# Patient Record
Sex: MALE | Race: WHITE | NOT HISPANIC OR LATINO | Employment: OTHER | URBAN - METROPOLITAN AREA
[De-identification: names, ages, dates, MRNs, and addresses within clinical notes are randomized per-mention and may not be internally consistent; named-entity substitution may affect disease eponyms.]

---

## 2017-04-19 ENCOUNTER — ALLSCRIPTS OFFICE VISIT (OUTPATIENT)
Dept: OTHER | Facility: OTHER | Age: 72
End: 2017-04-19

## 2017-08-23 ENCOUNTER — ALLSCRIPTS OFFICE VISIT (OUTPATIENT)
Dept: OTHER | Facility: OTHER | Age: 72
End: 2017-08-23

## 2017-10-24 NOTE — PROGRESS NOTES
Assessment  1  Actinic keratosis (702 0) (L57 0)   2  Screening for skin condition (V82 0) (Z13 89)   3  Seborrheic keratosis (702 19) (L82 1)   4  H/O malignant melanoma of skin (V10 82) (I16 460)   · Melanoma In situ excised March 2007, right neck   5  H/O nonmelanoma skin cancer (V10 83) (Z85 828)    Plan   · Wound care as instructed ; Status:Complete;   Done: 10LVE8462   · Use a sun block product with an SPF of 15 or more ; Status:Complete;   Done:  62CRN8884   · Follow-up visit in 4 Months Evaluation and Treatment  Follow-up  Status: Complete   Done: 65Fua2392    Discussion/Summary  Discussion Summary- [facility]:   Assessment #1: Seborrheic keratosis  Care Plan:   Patient reassured these are normal growths we acquire with age no treatment needed  Assessment #2: Screening for dermatologic disorders  Care Plan:   Nothing else of concern noted on complete exam follow-up in 4 months  Assessment #3: History skin cancer/history of melanoma  Care Plan:   No recurrence nothing else atypical sunblock recommended follow-up in 4 months continue self exams  Assessment #4: Actinic keratosis  Care Plan:   Patient advised lesions are precancers should resolve with cryosurgery if not to let us know sunblock recommended  Chief Complaint  Chief Complaint Free Text Note Form: 4 month skin cancer screening      History of Present Illness  HPI: 72-year-old male presents for overall checkup with previous history of both melanoma and nonmelanoma skin cancer complains of some scaling areas on his skin      Review of Systems  Complete Male Dermatology [facility] Patient:   Constitutional: Denies constitutional symptoms  Eyes: Denies eye symptoms  ENT:  denies ear symptoms, nasal symptoms, mouth or throat symptoms  Cardiovascular: Denies cardiovascular symptoms  Respiratory: Denies respiratory symptoms  Gastrointestinal: Denies gastrointestinal symptoms     Musculoskeletal: Denies musculoskeletal symptoms  Integumentary: Denies skin, hair and nail symptoms  Neurological: Denies neurologic symptoms  Psychiatric: Denies psychiatric symptoms  Endocrine: Denies endocrine symptoms  Hematologic/Lymphatic: Denies hematologic symptoms  Active Problems  1  Actinic keratosis (702 0) (L57 0)   2  Basal Cell Carcinoma Of Skin Of Trunk (173 51)   3  Changing skin lesion (709 9) (L98 9)   4  Herpes zoster (053 9) (B02 9)   5  Inflamed seborrheic keratosis (702 11) (L82 0)   6  Postherpetic neuralgia (053 19) (B02 29)   7  Screening for skin condition (V82 0) (Z13 89)   8  Seborrheic dermatitis (690 10) (L21 9)   9  Seborrheic keratosis (702 19) (L82 1)   10  Skin lesion (709 9) (L98 9)   11  Skin rash (782 1) (R21)   12  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 52) (C44 529)    Past Medical History  1  History of Basal cell carcinoma of face (173 31) (C44 310)   2  History of Basal Cell Carcinoma Of The Skin Of The Leg (173 71)   3  History of Basal Cell Carcinoma Of The Skin Of The Neck (173 41)   4  H/O malignant melanoma of skin (V10 82) (Z85 820)   5  H/O nonmelanoma skin cancer (V10 83) (X37 651)   6  History of benign neoplasm of skin (V13 3) (Z87 2)   7  History of viral warts (V12 09) (Z86 19)   8  History of Malignant Skin Neoplasm (V10 83)  Past Medical History Reviewed- Derm:   The past medical history was reviewed  Surgical History  1  History of Chemosurgery (Mohs Micrographic Technique)   2  History of Excision Of Lesion Face Malignant   3  History of Excision Of Lesion Legs Malignant   4  History of Excision Of Lesion Scalp Malignant   5  History of Excision Of Lesion Trunk Malignant   6  History of Mohs Micrographic Surgery Face  Surgical History Reviewed Claudetta Casey- Derm:   Surgical History reviewed      Family History  Mother    1   Family history of Cancer  Family History Reviewed- Derm:   Family History was reviewed      Social History   · Never A Smoker  Social History Reviewed Claudetta Casey- Derm: The social history was reviewed      Current Meds   1  [address] [facility]; Therapy: (Recorded:57Xcc8463) to Recorded   2  Losartan Potassium 25 MG Oral Tablet Recorded   3  Metoprolol Tartrate 50 MG Oral Tablet Recorded   4  Vitamin D3 TABS; Therapy: (Bertin Nelson) to Recorded   5  Warfarin Sodium 5 MG Oral Tablet Recorded  Medication List Reviewed: The medication list was reviewed and updated today  Allergies  1  Erythromycin Derivatives    Physical Exam    Constitutional   General appearance: Appears healthy and well developed  Lymphatic   No visible disturbance  Musculoskeletal   Digits and nails: No clubbing, cyanosis or edema  Cutaneous and nail exam normal     Skin   Scalp skin texture and hair distribution: Normal skin texture on scalp, normal hair distribution  Head: Abnormal     Neck: Normal turgor, no rashes, no lesions  Chest: Normal turgor, no rashes, no lesions  Abdomen: Normal turgor, no rashes, no lesions  Back: Normal turgor, no rashes, no lesions  Right upper extremity: Normal turgor, no rashes, no lesions  Left upper extremity: Normal turgor, no rashes, no lesions  Right lower extremity: Normal turgor, no rashes, no lesions  Left lower extremity: Normal turgor, no rashes, no lesions  Examination for skin lesions: Abnormal   Skin Lesions: Actinic Keratosis: on area number 4 on the diagram      Neuro/Psych   Alert and oriented x 3  Displays comfort and cooperation during encounterl  Affect is normal     Finding Scaling erythematous areas noted above normal keratotic papules with greasy stuck on appearance previous sites of skin cancer well-healed without recurrence nothing else atypical noted on exam       Procedure    Procedure: destruction of lesion  Indications for the procedure include actinic keratosis     Risks, benefits, alternatives, infection risk, bleeding risk, risk of allergic reaction and the risk of scarring were discussed with the patient--   verbal consent was obtained prior to the procedure  Procedure Note:   The lesion location: See Map  Destruction Technique: cryotherapy with liquid nitrogen application-- and-- 66-21 seconds via cryospray--   Destruction of 4 lesions  Post-Procedure:   Patient Status: the patient tolerated the procedure well  Complications: there were no complications  Future Appointments    Date/Time Provider Specialty Site   12/28/2017 01:20 PM KELVIN Lin   Dermatology [facility] [address]     Signatures   Electronically signed by : KELVIN Thrasher ; Aug 23 2017  1:51PM EST                       (Author)

## 2017-12-28 ENCOUNTER — ALLSCRIPTS OFFICE VISIT (OUTPATIENT)
Dept: OTHER | Facility: OTHER | Age: 72
End: 2017-12-28

## 2017-12-29 NOTE — PROGRESS NOTES
Assessment   1  Seborrheic keratosis (702 19) (L82 1)   2  Screening for skin condition (V82 0) (Z13 89)   3  H/O malignant melanoma of skin (V10 82) (W76 826)   · Melanoma In situ excised March 2007, right neck   4  H/O nonmelanoma skin cancer (V10 83) (Z85 828)    Plan    · Follow-up visit in 4 Months Evaluation and Treatment  Follow-up  Status: Hold For -    Scheduling  Requested for: 07HHH7405   · Use a sun block product with an SPF of 15 or more ; Status:Complete;   Done:    13GBU4012    Discussion/Summary   Discussion Summary- [facility]:      Assessment #1: Seborrheic keratosis  Care Plan:      Patient reassured these are normal growths we acquire with age no treatment needed  Assessment #2: Screening for dermatologic disorders  Care Plan:      Nothing else of concern noted on complete exam follow-up in 4 months difficult to assess small lesions because of excoriations  Assessment #3: History skin cancer/history of melanoma  Care Plan:      No recurrence nothing else atypical sunblock recommended follow-up in 4 months continue self exams  Chief Complaint   Chief Complaint Free Text Note Form: Patient is here for a 4 month follow up  History of Present Illness   HPI: 53-year-old male with history of both melanoma and nonmelanoma skin cancer presents for his 4 month checkup no specific complaints noted      Review of Systems   Complete Male Dermatology [facility] Patient:      Constitutional: Denies constitutional symptoms  Eyes: Denies eye symptoms  ENT:  denies ear symptoms, nasal symptoms, mouth or throat symptoms  Cardiovascular: Denies cardiovascular symptoms  Respiratory: Denies respiratory symptoms  Gastrointestinal: Denies gastrointestinal symptoms  Musculoskeletal: Denies musculoskeletal symptoms  Integumentary: Denies skin, hair and nail symptoms  Neurological: Denies neurologic symptoms        Psychiatric: Denies psychiatric symptoms  Endocrine: Denies endocrine symptoms  Hematologic/Lymphatic: Denies hematologic symptoms  Active Problems   1  Actinic keratosis (702 0) (L57 0)   2  Basal Cell Carcinoma Of Skin Of Trunk (173 51)   3  Changing skin lesion (709 9) (L98 9)   4  Herpes zoster (053 9) (B02 9)   5  Inflamed seborrheic keratosis (702 11) (L82 0)   6  Postherpetic neuralgia (053 19) (B02 29)   7  Screening for skin condition (V82 0) (Z13 89)   8  Seborrheic dermatitis (690 10) (L21 9)   9  Seborrheic keratosis (702 19) (L82 1)   10  Skin lesion (709 9) (L98 9)   11  Skin rash (782 1) (R21)   12  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 52) (C44 529)    Past Medical History   1  History of Basal cell carcinoma of face (173 31) (C44 310)   2  History of Basal Cell Carcinoma Of The Skin Of The Leg (173 71)   3  History of Basal Cell Carcinoma Of The Skin Of The Neck (173 41)   4  H/O malignant melanoma of skin (V10 82) (Z85 820)   5  H/O nonmelanoma skin cancer (V10 83) (A27 605)   6  History of benign neoplasm of skin (V13 3) (Z87 2)   7  History of viral warts (V12 09) (Z86 19)   8  History of Malignant Skin Neoplasm (V10 83)  Past Medical History Reviewed- Derm:    The past medical history was reviewed  Surgical History   1  History of Chemosurgery (Mohs Micrographic Technique)   2  History of Excision Of Lesion Face Malignant   3  History of Excision Of Lesion Legs Malignant   4  History of Excision Of Lesion Scalp Malignant   5  History of Excision Of Lesion Trunk Malignant   6  History of Mohs Micrographic Surgery Face  Surgical History Reviewed Steven Fishman- Derm:    Surgical History reviewed      Family History   Mother    1  Family history of Cancer  Family History Reviewed- Derm:    Family History was reviewed      Social History    · Never A Smoker  Social History Reviewed Steven Fishman- Derm: The social history was reviewed      Current Meds    1  [address] [facility];      Therapy: (Recorded:66Bev6323) to Recorded   2  Losartan Potassium 25 MG Oral Tablet Recorded   3  Metoprolol Tartrate 50 MG Oral Tablet Recorded   4  Vitamin D3 TABS; Therapy: (Risa Betancur) to Recorded   5  Warfarin Sodium 5 MG Oral Tablet Recorded  Medication List Reviewed: The medication list was reviewed and updated today  Allergies   1  Erythromycin Derivatives    Physical Exam        Constitutional      General appearance: Appears healthy and well developed  Lymphatic      No visible disturbance  Musculoskeletal      Digits and nails: No clubbing, cyanosis or edema  Cutaneous and nail exam normal        Skin      Scalp skin texture and hair distribution: Normal skin texture on scalp, normal hair distribution  Head: Normal turgor, no rashes, no lesions  Neck: Normal turgor, no rashes, no lesions  Chest: Normal turgor, no rashes, no lesions  Abdomen: Normal turgor, no rashes, no lesions  Back: Normal turgor, no rashes, no lesions  Right upper extremity: Normal turgor, no rashes, no lesions  Left upper extremity: Normal turgor, no rashes, no lesions  Right lower extremity: Normal turgor, no rashes, no lesions  Left lower extremity: Normal turgor, no rashes, no lesions  Neuro/Psych      Alert and oriented x 3  Displays comfort and cooperation during encounterl  Affect is normal        Finding Previous sites of skin cancer well healed without recurrence occasional excoriations especially on the scalp normal keratotic papules with greasy stuck appearance nothing else atypical noted on complete cutaneous exam       Future Appointments      Date/Time Provider Specialty Site   04/19/2018 11:15 AM KELVIN Lin   Dermatology [facility]     Signatures    Electronically signed by : KELVIN Thrasher ; Dec 28 2017  2:35PM EST                       (Author)

## 2018-05-01 ENCOUNTER — OFFICE VISIT (OUTPATIENT)
Dept: DERMATOLOGY | Facility: CLINIC | Age: 73
End: 2018-05-01
Payer: MEDICARE

## 2018-05-01 DIAGNOSIS — Z85.820 HISTORY OF MELANOMA: ICD-10-CM

## 2018-05-01 DIAGNOSIS — L30.4 INTERTRIGO: Primary | ICD-10-CM

## 2018-05-01 DIAGNOSIS — Z13.89 SCREENING FOR SKIN CONDITION: ICD-10-CM

## 2018-05-01 DIAGNOSIS — Z85.828 HISTORY OF SKIN CANCER: ICD-10-CM

## 2018-05-01 DIAGNOSIS — L57.0 ACTINIC KERATOSIS: ICD-10-CM

## 2018-05-01 DIAGNOSIS — L82.1 SEBORRHEIC KERATOSIS: ICD-10-CM

## 2018-05-01 PROCEDURE — 17000 DESTRUCT PREMALG LESION: CPT | Performed by: DERMATOLOGY

## 2018-05-01 PROCEDURE — 99213 OFFICE O/P EST LOW 20 MIN: CPT | Performed by: DERMATOLOGY

## 2018-05-01 PROCEDURE — 17003 DESTRUCT PREMALG LES 2-14: CPT | Performed by: DERMATOLOGY

## 2018-05-01 RX ORDER — METOPROLOL TARTRATE 50 MG/1
TABLET, FILM COATED ORAL
COMMUNITY

## 2018-05-01 RX ORDER — MELATONIN: COMMUNITY

## 2018-05-01 RX ORDER — WARFARIN SODIUM 5 MG/1
TABLET ORAL
COMMUNITY

## 2018-05-01 RX ORDER — LOSARTAN POTASSIUM 25 MG/1
TABLET ORAL
COMMUNITY

## 2018-05-01 NOTE — PATIENT INSTRUCTIONS
intertrigo skin rubbing again skin difficult to assess if this is psoriasis versus erythrasma patient does not request any treatment at this time   history of melanoma no recurrence nothing else atypical sun protection recommended continue to monitor  Actinic Keratosis:  Patient advised lesions are precancers  These should resolve with cryosurgery if not to let us know sun block recommended  Seborrheic keratosis patient reassured these are normal growths we acquire with age no treatment needed  History of skin cancer in no recurrence nothing else atypical sunblock recommended follow-up in 4 months  Screening for dermatologic disorders nothing else of concern noted on complete exam follow-up in 4 months  Treatment with Cryotherapy    The doctor has treated your skin with nitrogen, which is 320 degrees Fahrenheit below zero  He has given the treated area "frostbite "    Stinging should subside within a few hours  You can take Tylenol for pain, if needed  Over the next few days, it is normal if the area becomes reddened, a blood blister, or swollen with fluid  If the lesion treated was near the eye - you could get a swollen eye over the next few days  Do not panic! This is all temporary, and will resolve with time  There is no special treatment - just keep the area clean  Makeup and BandAids can be used, if preferred  When the area starts to dry up and peel off, using Vaseline can help healing  It usually takes up to a month for it to heal   Some lesions are recurrent and may require repeat treatments  If a lesion has not healed in one month, please don't hesitate to contact us  If you have any further questions that are not answered here, please call us  [phone]    Thank you for allowing us to care for you

## 2018-05-01 NOTE — PROGRESS NOTES
[facility]  [address] [address]     MRN: [id number] : 1945  Encounter: [id number]  Patient Information: Elizabeth Lee  Chief complaint:4 month checkup    History of present illness: 80-year-old male with numerous skin cancers in the past concerns regarding several spots on his skin and rash in the left groin   Past Medical History:   Diagnosis Date    Hypertension     Prostate cancer ([address] )     Stroke [facility])      Past Surgical History:   Procedure Laterality Date    SKIN SURGERY       Social History   History   Alcohol use Not on file     History   Drug use: Unknown     History   Smoking Status    Not on file   Smokeless Tobacco    Not on file     No family history on file  Meds/Allergies   Allergies   Allergen Reactions    Erythromycin        Meds:  Prior to Admission medications    Medication Sig Start Date End Date Taking?  Authorizing Provider   cholecalciferol (VITAMIN D3) 1,000 units tablet Take by mouth   Yes Historical Provider, MD   Fish Oil-Krill Oil (KRILL OIL PLUS) CAPS Take by mouth   Yes Historical Provider, MD   losartan (COZAAR) 25 mg tablet Take by mouth   Yes Historical Provider, MD   metoprolol tartrate (LOPRESSOR) 50 mg tablet Take by mouth   Yes Historical Provider, MD   warfarin (COUMADIN) 5 mg tablet Take by mouth   Yes Historical Provider, MD       Subjective:     Review of Systems:    General: negative for - chills, fatigue, fever,  weight gain or weight loss  Psychological: negative for - anxiety, behavioral disorder, concentration difficulties, decreased libido, depression, irritability, memory difficulties, mood swings, sleep disturbances or suicidal ideation  ENT: negative for - hearing difficulties , nasal congestion, nasal discharge, oral lesions, sinus pain, sneezing, sore throat  Allergy and Immunology: negative for - hives, insect bite sensitivity,  Hematological and Lymphatic: negative for - bleeding problems, blood clots,bruising, swollen lymph nodes  Endocrine: negative for - hair pattern changes, hot flashes, malaise/lethargy, mood swings, palpitations, polydipsia/polyuria, skin changes, temperature intolerance or unexpected weight change  Respiratory: negative for - cough, hemoptysis, orthopnea, shortness of breath, or wheezing  Cardiovascular: negative for - chest pain, dyspnea on exertion, edema,  Gastrointestinal: negative for - abdominal pain, nausea/vomiting  Genito-Urinary: negative for - dysuria, incontinence, irregular/heavy menses or urinary frequency/urgency  Musculoskeletal: negative for - gait disturbance, joint pain, joint stiffness, joint swelling, muscle pain, muscular weakness  Dermatological:  As in HPI  Neurological: negative for confusion, dizziness, headaches, impaired coordination/balance, memory loss, numbness/tingling, seizures, speech problems, tremors or weakness       Objective: There were no vitals taken for this visit  Physical Exam:    General Appearance:    Alert, cooperative, no distress   Head:    Normocephalic, without obvious abnormality, atraumatic           Skin:   A full skin exam was performed including scalp, head scalp, eyes, ears, nose, lips, neck, chest, axilla, abdomen, back, buttocks, bilateral upper extremities, bilateral lower extremities, hands, feet, fingers, toes, fingernails, and toenails  Scaling erythematous areas noted above below previous sites of skin cancer well healed without recurrence normal keratotic papules with greasy stuck on appearance nothing else remarkable noted on exam scaling erythematous well-demarcated patches noted in both groin folds KOH prep was performed and negative  Physical Exam   HENT:   Head:         Cryotherapy Procedure Note    Pre-operative Diagnosis: actinic keratosis    Plan:  1  Instructed to keep the area dry and clean thereafter  Apply petrolatum if area gets crusty        2  Recommended that the patient use acetaminophen  as needed for pain  Locations: face and scalp    Indications: Destruction of  Actinic keratosis x6    Patient informed of risks (permanent scarring, infection, light or dark discoloration, bleeding, infection, weakness, numbness and recurrence of the lesion) and benefits of the procedure and verbal informed consent obtained  The areas are treated with liquid nitrogen therapy, frozen until ice ball extended 2 mm beyond lesion, allowed to thaw, and treated again  The patient tolerated procedure well  The patient was instructed on post-op care, warned that there may be blister formation, redness and pain  Recommend OTC analgesia as needed for pain  Condition:  Stable    Complications:  none  Assessment:     1  Intertrigo     2  History of skin cancer     3  History of melanoma      Melanoma In situ excised March 2007, right neck   4  Screening for skin condition     5  Seborrheic keratosis     6  Actinic keratosis           Plan:    intertrigo skin rubbing again skin difficult to assess if this is psoriasis versus erythrasma patient does not request any treatment at this time   history of melanoma no recurrence nothing else atypical sun protection recommended continue to monitor  Actinic Keratosis:  Patient advised lesions are precancers  These should resolve with cryosurgery if not to let us know sun block recommended    Seborrheic keratosis patient reassured these are normal growths we acquire with age no treatment needed  History of skin cancer in no recurrence nothing else atypical sunblock recommended follow-up in 4 months  Screening for dermatologic disorders nothing else of concern noted on complete exam follow-up in 4 months  Yifan Ramos MD  5/1/2018,2:50 PM    Portions of the record may have been created with voice recognition software   Occasional wrong word or "sound a like" substitutions may have occurred due to the inherent limitations of voice recognition software   Read the chart carefully and recognize, using context, where substitutions have occurred

## 2018-10-04 ENCOUNTER — OFFICE VISIT (OUTPATIENT)
Dept: DERMATOLOGY | Facility: CLINIC | Age: 73
End: 2018-10-04
Payer: COMMERCIAL

## 2018-10-04 DIAGNOSIS — Z13.89 SCREENING FOR SKIN CONDITION: ICD-10-CM

## 2018-10-04 DIAGNOSIS — Z85.820 HISTORY OF MELANOMA: Chronic | ICD-10-CM

## 2018-10-04 DIAGNOSIS — Z85.828 HISTORY OF SKIN CANCER: ICD-10-CM

## 2018-10-04 DIAGNOSIS — L57.0 ACTINIC KERATOSIS: Primary | ICD-10-CM

## 2018-10-04 DIAGNOSIS — L82.1 SEBORRHEIC KERATOSIS: ICD-10-CM

## 2018-10-04 PROCEDURE — 17000 DESTRUCT PREMALG LESION: CPT | Performed by: DERMATOLOGY

## 2018-10-04 PROCEDURE — 99213 OFFICE O/P EST LOW 20 MIN: CPT | Performed by: DERMATOLOGY

## 2018-10-04 PROCEDURE — 17003 DESTRUCT PREMALG LES 2-14: CPT | Performed by: DERMATOLOGY

## 2018-10-04 NOTE — PATIENT INSTRUCTIONS
Actinic Keratosis:  Patient advised lesions are precancers  These should resolve with cryosurgery if not to let us know sun block recommended  history of melanoma it has been over 10 years continue to monitor sun protection  Again recommended and will continue to monitor on every 4 month basis  Seborrheic keratosis patient reassured these are normal growths we acquire with age no treatment needed  History of skin cancer in no recurrence nothing else atypical sunblock recommended follow-up in 4 months  Screening for dermatologic disorders nothing else of concern noted on complete exam follow-up in 4 months  Treatment with Cryotherapy    The doctor has treated your skin with nitrogen, which is 320 degrees Fahrenheit below zero  He has given the treated area "frostbite "    Stinging should subside within a few hours  You can take Tylenol for pain, if needed  Over the next few days, it is normal if the area becomes reddened, a blood blister, or swollen with fluid  If the lesion treated was near the eye - you could get a swollen eye over the next few days  Do not panic! This is all temporary, and will resolve with time  There is no special treatment - just keep the area clean  Makeup and BandAids can be used, if preferred  When the area starts to dry up and peel off, using Vaseline can help healing  It usually takes up to a month for it to heal   Some lesions are recurrent and may require repeat treatments  If a lesion has not healed in one month, please don't hesitate to contact us  If you have any further questions that are not answered here, please call us  [phone]    Thank you for allowing us to care for you

## 2018-10-04 NOTE — PROGRESS NOTES
[address] [facility] (formerly [facility]) DERMATOLOGY  [address] [facility] [place]  [phone]  [phone]     MRN: [id number] : 1945  Encounter: [id number]  Patient Information: Princess Jasmine  Chief complaint:4 months check    History of present illness:  72-year-old male with history of both melanoma and  nonmelanoma skin cancer presents for follow-up continues to have scaly areas on the skin and concerned regarding several pigmented lesions noted on his face  Past Medical History:   Diagnosis Date    Hypertension     Prostate cancer ([address] )     Stroke [facility])      Past Surgical History:   Procedure Laterality Date    SKIN SURGERY       Social History   History   Alcohol use Not on file     History   Drug use: Unknown     History   Smoking Status    Never Smoker   Smokeless Tobacco    Never Used     No family history on file  Meds/Allergies   Allergies   Allergen Reactions    Erythromycin        Meds:  Prior to Admission medications    Medication Sig Start Date End Date Taking?  Authorizing Provider   cholecalciferol (VITAMIN D3) 1,000 units tablet Take by mouth   Yes Historical Provider, MD   Fish Oil-Krill Oil (KRILL OIL PLUS) CAPS Take by mouth   Yes Historical Provider, MD   losartan (COZAAR) 25 mg tablet Take by mouth   Yes Historical Provider, MD   metoprolol tartrate (LOPRESSOR) 50 mg tablet Take by mouth   Yes Historical Provider, MD   warfarin (COUMADIN) 5 mg tablet Take by mouth   Yes Historical Provider, MD       Subjective:     Review of Systems:    General: negative for - chills, fatigue, fever,  weight gain or weight loss  Psychological: negative for - anxiety, behavioral disorder, concentration difficulties, decreased libido, depression, irritability, memory difficulties, mood swings, sleep disturbances or suicidal ideation  ENT: negative for - hearing difficulties , nasal congestion, nasal discharge, oral lesions, sinus pain, sneezing, sore throat  Allergy and Immunology: negative for - hives, insect bite sensitivity,  Hematological and Lymphatic: negative for - bleeding problems, blood clots,bruising, swollen lymph nodes  Endocrine: negative for - hair pattern changes, hot flashes, malaise/lethargy, mood swings, palpitations, polydipsia/polyuria, skin changes, temperature intolerance or unexpected weight change  Respiratory: negative for - cough, hemoptysis, orthopnea, shortness of breath, or wheezing  Cardiovascular: negative for - chest pain, dyspnea on exertion, edema,  Gastrointestinal: negative for - abdominal pain, nausea/vomiting  Genito-Urinary: negative for - dysuria, incontinence, irregular/heavy menses or urinary frequency/urgency  Musculoskeletal: negative for - gait disturbance, joint pain, joint stiffness, joint swelling, muscle pain, muscular weakness  Dermatological:  As in HPI  Neurological: negative for confusion, dizziness, headaches, impaired coordination/balance, memory loss, numbness/tingling, seizures, speech problems, tremors or weakness       Objective: There were no vitals taken for this visit  Physical Exam:    General Appearance:    Alert, cooperative, no distress   Head:    Normocephalic, without obvious abnormality, atraumatic           Skin:   A full skin exam was performed including scalp, head scalp, eyes, ears, nose, lips, neck, chest, axilla, abdomen, back, buttocks, bilateral upper extremities, bilateral lower extremities, hands, feet, fingers, toes, fingernails, and toenails  Previous site  Of skin cancer noted without recurrence normal keratotic papules greasy stuck on appearance scaling erythematous areas noted below nothing else remarkable on exam  Physical Exam   Constitutional:       HENT:   Head:            Cryotherapy Procedure Note    Pre-operative Diagnosis: actinic keratosis    Plan:  1  Instructed to keep the area dry and clean thereafter  Apply petrolatum if area gets crusty        2  Recommended that the patient use acetaminophen  as needed for pain  Locations:  Face and left arm    Indications: Destruction of  Actinic keratosis times 6    Patient informed of risks (permanent scarring, infection, light or dark discoloration, bleeding, infection, weakness, numbness and recurrence of the lesion) and benefits of the procedure and verbal informed consent obtained  The areas are treated with liquid nitrogen therapy, frozen until ice ball extended 2 mm beyond lesion, allowed to thaw, and treated again  The patient tolerated procedure well  The patient was instructed on post-op care, warned that there may be blister formation, redness and pain  Recommend OTC analgesia as needed for pain  Condition:  Stable    Complications:  none  Assessment:     1  Actinic keratosis     2  Seborrheic keratosis     3  Screening for skin condition     4  History of skin cancer     5  History of melanoma      Melanoma In situ excised March 2007, right neck         Plan:   Actinic Keratosis:  Patient advised lesions are precancers  These should resolve with cryosurgery if not to let us know sun block recommended  history of melanoma it has been over 10 years continue to monitor sun protection  Again recommended and will continue to monitor on every 4 month basis  Seborrheic keratosis patient reassured these are normal growths we acquire with age no treatment needed  History of skin cancer in no recurrence nothing else atypical sunblock recommended follow-up in 4 months  Screening for dermatologic disorders nothing else of concern noted on complete exam follow-up in 4 months    Miguel Angel Nelson MD  10/4/2018,1:13 PM    Portions of the record may have been created with voice recognition software   Occasional wrong word or "sound a like" substitutions may have occurred due to the inherent limitations of voice recognition software   Read the chart carefully and recognize, using context, where substitutions have occurred

## 2019-02-21 ENCOUNTER — OFFICE VISIT (OUTPATIENT)
Dept: DERMATOLOGY | Facility: CLINIC | Age: 74
End: 2019-02-21
Payer: MEDICARE

## 2019-02-21 DIAGNOSIS — Z85.820 HISTORY OF MELANOMA: Chronic | ICD-10-CM

## 2019-02-21 DIAGNOSIS — Z13.89 SCREENING FOR SKIN CONDITION: ICD-10-CM

## 2019-02-21 DIAGNOSIS — L57.0 ACTINIC KERATOSIS: Primary | ICD-10-CM

## 2019-02-21 DIAGNOSIS — L82.1 SEBORRHEIC KERATOSIS: ICD-10-CM

## 2019-02-21 DIAGNOSIS — Z85.828 HISTORY OF SKIN CANCER: ICD-10-CM

## 2019-02-21 PROCEDURE — 17000 DESTRUCT PREMALG LESION: CPT | Performed by: DERMATOLOGY

## 2019-02-21 PROCEDURE — 99213 OFFICE O/P EST LOW 20 MIN: CPT | Performed by: DERMATOLOGY

## 2019-02-21 PROCEDURE — 17003 DESTRUCT PREMALG LES 2-14: CPT | Performed by: DERMATOLOGY

## 2019-02-21 NOTE — PROGRESS NOTES
[facility] DERMATOLOGY  [address] [place] [id number] [place] [address]  [phone]     MRN: [id number] : 1945  Encounter: [id number]  Patient Information: Ambrosio Valdes  Chief complaint:  Four-month checkup    History of present illness:  79-year-old male with history of both melanoma and nonmelanoma skin cancer presents for overall checkup notes some scaling areas on his face that not resolved  Past Medical History:   Diagnosis Date    Hypertension     Prostate cancer ([address] )     Stroke [facility])      Past Surgical History:   Procedure Laterality Date    SKIN SURGERY       Social History   Social History     Substance and Sexual Activity   Alcohol Use Not on file     Social History     Substance and Sexual Activity   Drug Use Not on file     Social History     Tobacco Use   Smoking Status Never Smoker   Smokeless Tobacco Never Used     No family history on file  Meds/Allergies   Allergies   Allergen Reactions    Erythromycin        Meds:  Prior to Admission medications    Medication Sig Start Date End Date Taking?  Authorizing Provider   cholecalciferol (VITAMIN D3) 1,000 units tablet Take by mouth   Yes Historical Provider, MD   Fish Oil-Krill Oil (KRILL OIL PLUS) CAPS Take by mouth   Yes Historical Provider, MD   losartan (COZAAR) 25 mg tablet Take by mouth   Yes Historical Provider, MD   metoprolol tartrate (LOPRESSOR) 50 mg tablet Take by mouth   Yes Historical Provider, MD   warfarin (COUMADIN) 5 mg tablet Take by mouth   Yes Historical Provider, MD       Subjective:     Review of Systems:    General: negative for - chills, fatigue, fever,  weight gain or weight loss  Psychological: negative for - anxiety, behavioral disorder, concentration difficulties, decreased libido, depression, irritability, memory difficulties, mood swings, sleep disturbances or suicidal ideation  ENT: negative for - hearing difficulties , nasal congestion, nasal discharge, oral lesions, sinus pain, sneezing, sore throat  Allergy and Immunology: negative for - hives, insect bite sensitivity,  Hematological and Lymphatic: negative for - bleeding problems, blood clots,bruising, swollen lymph nodes  Endocrine: negative for - hair pattern changes, hot flashes, malaise/lethargy, mood swings, palpitations, polydipsia/polyuria, skin changes, temperature intolerance or unexpected weight change  Respiratory: negative for - cough, hemoptysis, orthopnea, shortness of breath, or wheezing  Cardiovascular: negative for - chest pain, dyspnea on exertion, edema,  Gastrointestinal: negative for - abdominal pain, nausea/vomiting  Genito-Urinary: negative for - dysuria, incontinence, irregular/heavy menses or urinary frequency/urgency  Musculoskeletal: negative for - gait disturbance, joint pain, joint stiffness, joint swelling, muscle pain, muscular weakness  Dermatological:  As in HPI  Neurological: negative for confusion, dizziness, headaches, impaired coordination/balance, memory loss, numbness/tingling, seizures, speech problems, tremors or weakness       Objective: There were no vitals taken for this visit  Physical Exam:    General Appearance:    Alert, cooperative, no distress   Head:    Normocephalic, without obvious abnormality, atraumatic           Skin:   A full skin exam was performed including scalp, head scalp, eyes, ears, nose, lips, neck, chest, axilla, abdomen, back, buttocks, bilateral upper extremities, bilateral lower extremities, hands, feet, fingers, toes, fingernails, and toenails previous sites of skin cancer well healed normal keratotic papules greasy stuck on appearance scaling erythematous areas noted below nothing else atypical noted on exam  Physical Exam   HENT:   Head:       Cryotherapy Procedure Note    Pre-operative Diagnosis: actinic keratosis    Plan:  1  Instructed to keep the area dry and clean thereafter  Apply petrolatum if area gets crusty        2  Recommended that the patient use acetaminophen  as needed for pain  Locations:  Face    Indications: Destruction of actinic keratoses x4    Patient informed of risks (permanent scarring, infection, light or dark discoloration, bleeding, infection, weakness, numbness and recurrence of the lesion) and benefits of the procedure and verbal informed consent obtained  The areas are treated with liquid nitrogen therapy, frozen until ice ball extended 2 mm beyond lesion, allowed to thaw, and treated again  The patient tolerated procedure well  The patient was instructed on post-op care, warned that there may be blister formation, redness and pain  Recommend OTC analgesia as needed for pain  Condition:  Stable    Complications:  none  Assessment:     1  Actinic keratosis     2  Seborrheic keratosis     3  History of skin cancer     4  History of melanoma     5  Screening for skin condition           Plan:   Actinic Keratosis:  Patient advised lesions are precancers  These should resolve with cryosurgery if not to let us know sun block recommended  History of melanoma no recurrence nothing else atypical continue to monitor for and and also discussed sun protection  Seborrheic keratosis patient reassured these are normal growths we acquire with age no treatment needed  History of skin cancer in no recurrence nothing else atypical sunblock recommended follow-up in 4 months  Screening for dermatologic disorders nothing else of concern noted on complete exam follow-up in 4 months    Susan Mcneill MD  2/21/2019,11:39 AM    Portions of the record may have been created with voice recognition software   Occasional wrong word or "sound a like" substitutions may have occurred due to the inherent limitations of voice recognition software   Read the chart carefully and recognize, using context, where substitutions have occurred

## 2019-02-21 NOTE — PATIENT INSTRUCTIONS
Actinic Keratosis:  Patient advised lesions are precancers  These should resolve with cryosurgery if not to let us know sun block recommended  History of melanoma no recurrence nothing else atypical continue to monitor for and and also discussed sun protection  Seborrheic keratosis patient reassured these are normal growths we acquire with age no treatment needed  History of skin cancer in no recurrence nothing else atypical sunblock recommended follow-up in 4 months  Screening for dermatologic disorders nothing else of concern noted on complete exam follow-up in 4 months  Treatment with Cryotherapy    The doctor has treated your skin with nitrogen, which is 320 degrees Fahrenheit below zero  He has given the treated area "frostbite "    Stinging should subside within a few hours  You can take Tylenol for pain, if needed  Over the next few days, it is normal if the area becomes reddened, a blood blister, or swollen with fluid  If the lesion treated was near the eye - you could get a swollen eye over the next few days  Do not panic! This is all temporary, and will resolve with time  There is no special treatment - just keep the area clean  Makeup and BandAids can be used, if preferred  When the area starts to dry up and peel off, using Vaseline can help healing  It usually takes up to a month for it to heal   Some lesions are recurrent and may require repeat treatments  If a lesion has not healed in one month, please don't hesitate to contact us  If you have any further questions that are not answered here, please call us  [phone]    Thank you for allowing us to care for you

## 2019-07-24 ENCOUNTER — OFFICE VISIT (OUTPATIENT)
Dept: DERMATOLOGY | Facility: CLINIC | Age: 74
End: 2019-07-24
Payer: MEDICARE

## 2019-07-24 DIAGNOSIS — Z13.89 SCREENING FOR SKIN CONDITION: ICD-10-CM

## 2019-07-24 DIAGNOSIS — Z85.820 HISTORY OF MELANOMA: ICD-10-CM

## 2019-07-24 DIAGNOSIS — Z85.828 HISTORY OF SKIN CANCER: ICD-10-CM

## 2019-07-24 DIAGNOSIS — L82.1 SEBORRHEIC KERATOSIS: Primary | ICD-10-CM

## 2019-07-24 PROCEDURE — 99213 OFFICE O/P EST LOW 20 MIN: CPT | Performed by: DERMATOLOGY

## 2019-07-24 NOTE — PROGRESS NOTES
[address]  [address] [facility] [address]  [phone]     MRN: [id number] : 1945  Encounter: [id number]  Patient Information: Jayashree Castellano  Chief complaint:  Four-month checkup    History of present illness:  82-year-old male with previous history of both nonmelanoma and melanoma skin cancer presents for overall skin check no specific concerns noted  Past Medical History:   Diagnosis Date    Hypertension     Prostate cancer ([address] )     Stroke [facility])      Past Surgical History:   Procedure Laterality Date    SKIN SURGERY       Social History   Social History     Substance and Sexual Activity   Alcohol Use Yes    Frequency: Monthly or less    Drinks per session: 1 or 2    Binge frequency: Never    Comment: on occasion     Social History     Substance and Sexual Activity   Drug Use Never     Social History     Tobacco Use   Smoking Status Never Smoker   Smokeless Tobacco Never Used     History reviewed  No pertinent family history  Meds/Allergies   Allergies   Allergen Reactions    Erythromycin        Meds:  Prior to Admission medications    Medication Sig Start Date End Date Taking?  Authorizing Provider   cholecalciferol (VITAMIN D3) 1,000 units tablet Take by mouth   Yes Historical Provider, MD   Fish Oil-Krill Oil (KRILL OIL PLUS) CAPS Take by mouth   Yes Historical Provider, MD   losartan (COZAAR) 25 mg tablet Take by mouth   Yes Historical Provider, MD   metoprolol tartrate (LOPRESSOR) 50 mg tablet Take by mouth   Yes Historical Provider, MD   warfarin (COUMADIN) 5 mg tablet Take by mouth   Yes Historical Provider, MD       Subjective:     Review of Systems:    General: negative for - chills, fatigue, fever,  weight gain or weight loss  Psychological: negative for - anxiety, behavioral disorder, concentration difficulties, decreased libido, depression, irritability, memory difficulties, mood swings, sleep disturbances or suicidal ideation  ENT: negative for - hearing difficulties , nasal congestion, nasal discharge, oral lesions, sinus pain, sneezing, sore throat  Allergy and Immunology: negative for - hives, insect bite sensitivity,  Hematological and Lymphatic: negative for - bleeding problems, blood clots,bruising, swollen lymph nodes  Endocrine: negative for - hair pattern changes, hot flashes, malaise/lethargy, mood swings, palpitations, polydipsia/polyuria, skin changes, temperature intolerance or unexpected weight change  Respiratory: negative for - cough, hemoptysis, orthopnea, shortness of breath, or wheezing  Cardiovascular: negative for - chest pain, dyspnea on exertion, edema,  Gastrointestinal: negative for - abdominal pain, nausea/vomiting  Genito-Urinary: negative for - dysuria, incontinence, irregular/heavy menses or urinary frequency/urgency  Musculoskeletal: negative for - gait disturbance, joint pain, joint stiffness, joint swelling, muscle pain, muscular weakness  Dermatological:  As in HPI  Neurological: negative for confusion, dizziness, headaches, impaired coordination/balance, memory loss, numbness/tingling, seizures, speech problems, tremors or weakness       Objective: There were no vitals taken for this visit  Physical Exam:    General Appearance:    Alert, cooperative, no distress   Head:    Normocephalic, without obvious abnormality, atraumatic           Skin:   A full skin exam was performed including scalp, head scalp, eyes, ears, nose, lips, neck, chest, axilla, abdomen, back, buttocks, bilateral upper extremities, bilateral lower extremities, hands, feet, fingers, toes, fingernails, and toenails  normal keratotic papules greasy stuck on appearance previous sites of skin cancer well healed without recurrence nothing else atypical noted exam extensive papules pustules noted on the face     Assessment:     1  Seborrheic keratosis     2  History of skin cancer     3   Screening for skin condition 4  History of melanoma           Plan:   History of melanoma no recurrence nothing else atypical continue to monitor every 4 months  Seborrheic keratosis patient reassured these are normal growths we acquire with age no treatment needed  History of skin cancer in no recurrence nothing else atypical sunblock recommended follow-up in 4 months  Screening for dermatologic disorders nothing else of concern noted on complete exam follow-up in 4 months patient with rosacea does not wish treatment at this time    Kathleen Diaz MD  7/24/2019,12:56 PM    Portions of the record may have been created with voice recognition software   Occasional wrong word or "sound a like" substitutions may have occurred due to the inherent limitations of voice recognition software   Read the chart carefully and recognize, using context, where substitutions have occurred

## 2019-07-24 NOTE — PATIENT INSTRUCTIONS
History of melanoma no recurrence nothing else atypical continue to monitor every 4 months  Seborrheic keratosis patient reassured these are normal growths we acquire with age no treatment needed  History of skin cancer in no recurrence nothing else atypical sunblock recommended follow-up in 4 months  Screening for dermatologic disorders nothing else of concern noted on complete exam follow-up in 4 months patient with rosacea does not wish treatment at this time

## 2019-12-10 ENCOUNTER — OFFICE VISIT (OUTPATIENT)
Dept: DERMATOLOGY | Facility: CLINIC | Age: 74
End: 2019-12-10
Payer: MEDICARE

## 2019-12-10 DIAGNOSIS — L30.4 INTERTRIGO: ICD-10-CM

## 2019-12-10 DIAGNOSIS — Z85.828 HISTORY OF SKIN CANCER: ICD-10-CM

## 2019-12-10 DIAGNOSIS — L82.1 SEBORRHEIC KERATOSIS: ICD-10-CM

## 2019-12-10 DIAGNOSIS — L57.0 ACTINIC KERATOSIS: Primary | ICD-10-CM

## 2019-12-10 DIAGNOSIS — Z13.89 SCREENING FOR SKIN CONDITION: ICD-10-CM

## 2019-12-10 DIAGNOSIS — Z85.820 HISTORY OF MELANOMA: ICD-10-CM

## 2019-12-10 PROCEDURE — 17003 DESTRUCT PREMALG LES 2-14: CPT | Performed by: DERMATOLOGY

## 2019-12-10 PROCEDURE — 17000 DESTRUCT PREMALG LESION: CPT | Performed by: DERMATOLOGY

## 2019-12-10 PROCEDURE — 99214 OFFICE O/P EST MOD 30 MIN: CPT | Performed by: DERMATOLOGY

## 2019-12-10 RX ORDER — FLUTICASONE PROPIONATE 0.05 %
CREAM (GRAM) TOPICAL DAILY
Qty: 30 G | Refills: 1 | Status: SHIPPED | OUTPATIENT
Start: 2019-12-10 | End: 2021-06-24 | Stop reason: SDUPTHER

## 2019-12-10 NOTE — PROGRESS NOTES
[address]  [address] [facility] [address]  [phone]  [phone]     MRN: [id number] : 1945  Encounter: [id number]  Patient Information: Cody Vaughan  Chief complaint:  Four-month checkup    History of present illness:  79-year-old male with previous history of both melanoma nonmelanoma skin cancer presents for his 4 month checkup no specific concerns noted patient also complaining of a rash groin area  Past Medical History:   Diagnosis Date    Hypertension     Prostate cancer ([address] )     Stroke [facility])      Past Surgical History:   Procedure Laterality Date    SKIN SURGERY       Social History   Social History     Substance and Sexual Activity   Alcohol Use Yes    Frequency: Monthly or less    Drinks per session: 1 or 2    Binge frequency: Never    Comment: on occasion     Social History     Substance and Sexual Activity   Drug Use Never     Social History     Tobacco Use   Smoking Status Never Smoker   Smokeless Tobacco Never Used     No family history on file  Meds/Allergies   Allergies   Allergen Reactions    Erythromycin        Meds:  Prior to Admission medications    Medication Sig Start Date End Date Taking?  Authorizing Provider   cholecalciferol (VITAMIN D3) 1,000 units tablet Take by mouth   Yes Historical Provider, MD   Fish Oil-Krill Oil (KRILL OIL PLUS) CAPS Take by mouth   Yes Historical Provider, MD   losartan (COZAAR) 25 mg tablet Take by mouth   Yes Historical Provider, MD   metoprolol tartrate (LOPRESSOR) 50 mg tablet Take by mouth   Yes Historical Provider, MD   warfarin (COUMADIN) 5 mg tablet Take by mouth   Yes Historical Provider, MD       Subjective:     Review of Systems:    General: negative for - chills, fatigue, fever,  weight gain or weight loss  Psychological: negative for - anxiety, behavioral disorder, concentration difficulties, decreased libido, depression, irritability, memory difficulties, mood swings, sleep disturbances or suicidal ideation  ENT: negative for - hearing difficulties , nasal congestion, nasal discharge, oral lesions, sinus pain, sneezing, sore throat  Allergy and Immunology: negative for - hives, insect bite sensitivity,  Hematological and Lymphatic: negative for - bleeding problems, blood clots,bruising, swollen lymph nodes  Endocrine: negative for - hair pattern changes, hot flashes, malaise/lethargy, mood swings, palpitations, polydipsia/polyuria, skin changes, temperature intolerance or unexpected weight change  Respiratory: negative for - cough, hemoptysis, orthopnea, shortness of breath, or wheezing  Cardiovascular: negative for - chest pain, dyspnea on exertion, edema,  Gastrointestinal: negative for - abdominal pain, nausea/vomiting  Genito-Urinary: negative for - dysuria, incontinence, irregular/heavy menses or urinary frequency/urgency  Musculoskeletal: negative for - gait disturbance, joint pain, joint stiffness, joint swelling, muscle pain, muscular weakness  Dermatological:  As in HPI  Neurological: negative for confusion, dizziness, headaches, impaired coordination/balance, memory loss, numbness/tingling, seizures, speech problems, tremors or weakness       Objective: There were no vitals taken for this visit      Physical Exam:    General Appearance:    Alert, cooperative, no distress   Head:    Normocephalic, without obvious abnormality, atraumatic           Skin:   A full skin exam was performed including scalp, head scalp, eyes, ears, nose, lips, neck, chest, axilla, abdomen, back, buttocks, bilateral upper extremities, bilateral lower extremities, hands, feet, fingers, toes, fingernails, and toenails scaling erythematous well-demarcated patches noted in the groin also scaling areas noted below normal keratotic papules with greasy stuck on appearance previous sites skin cancer well healed without recurrence nothing else remarkable noted on exam  Physical Exam Constitutional:       HENT:   Head:         Cryotherapy Procedure Note    Pre-operative Diagnosis: actinic keratosis    Plan:  1  Instructed to keep the area dry and clean thereafter  Apply petrolatum if area gets crusty  2  Recommended that the patient use acetaminophen  as needed for pain  Locations:  Face back scalp    Indications: Destruction of actinic keratoses x7    Patient informed of risks (permanent scarring, infection, light or dark discoloration, bleeding, infection, weakness, numbness and recurrence of the lesion) and benefits of the procedure and verbal informed consent obtained  The areas are treated with liquid nitrogen therapy, frozen until ice ball extended 2 mm beyond lesion, allowed to thaw, and treated again  The patient tolerated procedure well  The patient was instructed on post-op care, warned that there may be blister formation, redness and pain  Recommend OTC analgesia as needed for pain  Condition:  Stable    Complications:  none  Assessment:     1  Actinic keratosis     2  Intertrigo     3  History of melanoma     4  Screening for skin condition     5  Seborrheic keratosis     6  History of skin cancer           Plan:   Actinic Keratosis:  Patient advised lesions are precancers  These should resolve with cryosurgery if not to let us know sun block recommended    Intertrigo question inverse psoriasis will go ahead treat with a topical steroid follow-up if no improvement  Seborrheic keratosis patient reassured these are normal growths we acquire with age no treatment needed  History of skin cancer in no recurrence nothing else atypical sunblock recommended follow-up in 4 months  Screening for dermatologic disorders nothing else of concern noted on complete exam follow-up in 4 months      Dorothea Corado MD  12/10/2019,12:10 PM    Portions of the record may have been created with voice recognition software   Occasional wrong word or "sound a like" substitutions may have occurred due to the inherent limitations of voice recognition software   Read the chart carefully and recognize, using context, where substitutions have occurred

## 2020-04-23 ENCOUNTER — TELEMEDICINE (OUTPATIENT)
Dept: DERMATOLOGY | Facility: CLINIC | Age: 75
End: 2020-04-23
Payer: MEDICARE

## 2020-04-23 DIAGNOSIS — L57.0 ACTINIC KERATOSIS: Primary | ICD-10-CM

## 2020-04-23 DIAGNOSIS — Z85.828 HISTORY OF SKIN CANCER: ICD-10-CM

## 2020-04-23 DIAGNOSIS — Z13.89 SCREENING FOR SKIN CONDITION: ICD-10-CM

## 2020-04-23 DIAGNOSIS — Z85.820 HISTORY OF MELANOMA: ICD-10-CM

## 2020-04-23 DIAGNOSIS — L82.1 SEBORRHEIC KERATOSIS: ICD-10-CM

## 2020-04-23 PROCEDURE — 99214 OFFICE O/P EST MOD 30 MIN: CPT | Performed by: DERMATOLOGY

## 2020-04-23 RX ORDER — UBIDECARENONE 75 MG
CAPSULE ORAL DAILY
COMMUNITY

## 2020-06-17 ENCOUNTER — TELEPHONE (OUTPATIENT)
Dept: DERMATOLOGY | Facility: CLINIC | Age: 75
End: 2020-06-17

## 2020-06-18 ENCOUNTER — OFFICE VISIT (OUTPATIENT)
Dept: DERMATOLOGY | Facility: CLINIC | Age: 75
End: 2020-06-18
Payer: MEDICARE

## 2020-06-18 VITALS — TEMPERATURE: 97.2 F

## 2020-06-18 DIAGNOSIS — Z13.89 SCREENING FOR SKIN CONDITION: ICD-10-CM

## 2020-06-18 DIAGNOSIS — Z85.820 HISTORY OF MELANOMA: ICD-10-CM

## 2020-06-18 DIAGNOSIS — L82.1 SEBORRHEIC KERATOSIS: ICD-10-CM

## 2020-06-18 DIAGNOSIS — Z85.828 HISTORY OF SKIN CANCER: ICD-10-CM

## 2020-06-18 DIAGNOSIS — L98.9 UNKNOWN SKIN LESION: Primary | ICD-10-CM

## 2020-06-18 DIAGNOSIS — L57.0 ACTINIC KERATOSIS: ICD-10-CM

## 2020-06-18 PROCEDURE — 11102 TANGNTL BX SKIN SINGLE LES: CPT | Performed by: DERMATOLOGY

## 2020-06-18 PROCEDURE — 88305 TISSUE EXAM BY PATHOLOGIST: CPT | Performed by: STUDENT IN AN ORGANIZED HEALTH CARE EDUCATION/TRAINING PROGRAM

## 2020-06-18 PROCEDURE — 99213 OFFICE O/P EST LOW 20 MIN: CPT | Performed by: DERMATOLOGY

## 2020-06-18 PROCEDURE — 17003 DESTRUCT PREMALG LES 2-14: CPT | Performed by: DERMATOLOGY

## 2020-06-18 PROCEDURE — 17000 DESTRUCT PREMALG LESION: CPT | Performed by: DERMATOLOGY

## 2020-06-24 ENCOUNTER — TELEPHONE (OUTPATIENT)
Dept: DERMATOLOGY | Facility: CLINIC | Age: 75
End: 2020-06-24

## 2020-06-26 ENCOUNTER — TELEPHONE (OUTPATIENT)
Dept: DERMATOLOGY | Facility: CLINIC | Age: 75
End: 2020-06-26

## 2020-06-29 ENCOUNTER — PROCEDURE VISIT (OUTPATIENT)
Dept: DERMATOLOGY | Facility: CLINIC | Age: 75
End: 2020-06-29
Payer: MEDICARE

## 2020-06-29 VITALS — TEMPERATURE: 99.8 F

## 2020-06-29 DIAGNOSIS — C44.712 BASAL CELL CARCINOMA OF FOOT, RIGHT: Primary | ICD-10-CM

## 2020-06-29 PROCEDURE — 88305 TISSUE EXAM BY PATHOLOGIST: CPT | Performed by: STUDENT IN AN ORGANIZED HEALTH CARE EDUCATION/TRAINING PROGRAM

## 2020-06-29 PROCEDURE — 12042 INTMD RPR N-HF/GENIT2.6-7.5: CPT | Performed by: DERMATOLOGY

## 2020-06-29 PROCEDURE — 11622 EXC S/N/H/F/G MAL+MRG 1.1-2: CPT | Performed by: DERMATOLOGY

## 2020-07-07 ENCOUNTER — TELEPHONE (OUTPATIENT)
Dept: DERMATOLOGY | Facility: CLINIC | Age: 75
End: 2020-07-07

## 2020-07-08 ENCOUNTER — OFFICE VISIT (OUTPATIENT)
Dept: DERMATOLOGY | Facility: CLINIC | Age: 75
End: 2020-07-08

## 2020-07-08 VITALS — TEMPERATURE: 97.2 F

## 2020-07-08 DIAGNOSIS — C44.712 BASAL CELL CARCINOMA OF FOOT, RIGHT: Primary | ICD-10-CM

## 2020-07-08 PROCEDURE — 99024 POSTOP FOLLOW-UP VISIT: CPT | Performed by: DERMATOLOGY

## 2020-07-08 NOTE — PROGRESS NOTES
[address]  [facility] [address]  [place] [address]  [phone]  [phone]     MRN: [id number] : 1945  Encounter: [id number]  Patient Information: Princess Jasmine    Subjective:     68-year-old male presents for planned suture removal previously excised basal cell carcinoma right instep     Objective:   Temp (!) 97 2 °F (36 2 °C)     Physical Exam:    General Appearance:    Alert, cooperative, no distress   Skin:   Previous site of excision so some inflammation significant drainage  Procedure:  Suture removal  Site of excision:  Right instep  Wound was cleansed with saline  Wound is intact  Sutures removed  Steri-Strips applied  Biopsy revealed basal cell carcinoma focal involvement margins     Assessment:     1  Basal cell carcinoma of foot, right           Plan:   Plan on will further excision in about 6 weeks      Prior to Admission medications    Medication Sig Start Date End Date Taking?  Authorizing Provider   cholecalciferol (VITAMIN D3) 1,000 units tablet Take by mouth   Yes Historical Provider, MD   cyanocobalamin (VITAMIN B-12) 100 mcg tablet Take by mouth daily   Yes Historical Provider, MD   Fish Oil-Krill Oil (KRILL OIL PLUS) CAPS Take by mouth   Yes Historical Provider, MD   losartan (COZAAR) 25 mg tablet Take by mouth   Yes Historical Provider, MD   metoprolol tartrate (LOPRESSOR) 50 mg tablet Take by mouth   Yes Historical Provider, MD   warfarin (COUMADIN) 5 mg tablet Take by mouth   Yes Historical Provider, MD   fluticasone (CUTIVATE) 0 05 % cream Apply topically daily To rash in groin until resolved  Patient not taking: Reported on 2020 12/10/19   Wan Chong MD     Allergies   Allergen Reactions    Erythromycin        Wan Chong MD  2020,3:56 PM    Portions of the record may have been created with voice recognition software   Occasional wrong word or "sound a like" substitutions may have occurred due to the inherent limitations of voice recognition software   Read the chart carefully and recognize, using context, where substitutions have occurred

## 2020-07-08 NOTE — PATIENT INSTRUCTIONS
Plan on will further excision in about 6 weeks  STERI-STRIPS (BUTTERFLY) POST SURGERY        Steri-Strips have been placed over your incision site to give added support  Please continue to bathe the area as usual     Eventually the Steri-Strips will begin to peel off on the ends  Snip the raised ends off with scissors and let the rest fall off on their own  If you have any questions, please call our office   [phone]

## 2020-08-19 ENCOUNTER — PROCEDURE VISIT (OUTPATIENT)
Dept: DERMATOLOGY | Facility: CLINIC | Age: 75
End: 2020-08-19
Payer: MEDICARE

## 2020-08-19 VITALS — TEMPERATURE: 98.3 F

## 2020-08-19 DIAGNOSIS — C44.712 BASAL CELL CARCINOMA OF FOOT, RIGHT: Primary | ICD-10-CM

## 2020-08-19 PROCEDURE — 88305 TISSUE EXAM BY PATHOLOGIST: CPT | Performed by: STUDENT IN AN ORGANIZED HEALTH CARE EDUCATION/TRAINING PROGRAM

## 2020-08-19 PROCEDURE — 12042 INTMD RPR N-HF/GENIT2.6-7.5: CPT | Performed by: DERMATOLOGY

## 2020-08-19 PROCEDURE — 11622 EXC S/N/H/F/G MAL+MRG 1.1-2: CPT | Performed by: DERMATOLOGY

## 2020-08-19 NOTE — PROGRESS NOTES
[facility] DERMATOLOGY  [address] [place] [address]  [phone]  [phone]     MRN: [id number] : 1945  Encounter: [id number]  Patient Information: Malachi Roche    Subjective:     79-year-old male presents for planned re-excision of previously biopsied in excised basal cell carcinoma right dorsum of foot  Excision showed focal involvement of the margins and therefore we did elected to go ahead and perform a reexcision     Objective:   Temp 98 3 °F (36 8 °C)     Physical Exam:    General Appearance:    Alert, cooperative, no distress   Skin:  Previous site excision noted no definitive sign of local persistence of disease    Procedure name: Excision with intermediate layered closure        Location:  Right dorsum of foot    Diagnosis: Basal cell carcinoma    Size of lesion:7mm    Margins: 5 mm around previous scar    Size + Margins: 17 mm    I explained the diagnosis to the patient and we recommend an excision of the lesion for diagnosis and/or treatment  Potential complications include, but are not limited to: scarring, bleeding, infection, incomplete removal, recurrence, and nerve damage  The risks, benefits, and alternatives were discussed with the patient in detail  The location of the tumor was identified, circled, and confirmed by the patient  The correct patient, site, and procedure were confirmed  Anesthesia: 1% xylocaine with 1:100,000 epinephrine 6 cc  The patient was brought into the room, prepped and draped sterilely in the usual manner and anesthesia was administered by local infiltration  A fusiform shape was drawn around the lesion, and the margins were incised to the level of the subcutaneous fat with a number 15cc Bard-Sharath blade  The tissue was removed with sharp and blunt dissection  The lateral margins of the resulting defect were undermined with sharp and blunt dissection    Hemostasis was achieved with electrocautery  The deeper layers of the defect, including subcutaneous fat and fascia, had to be approximated to reduce tension on the suture line  Layered wound closure was performed  The wound was cleaned with saline, dried off, petroleum applied, and the wound was covered with a pressure dressing  The patient was given detailed verbal and written instructions on postoperative care  Suture for adipose/fascial/dermal layer closure: 4-0  vicryl 4 sutures interrupted    Suture used to approximate epidermis: 4-0  nylon 7sutures interrupted    Final wound length: 3 5 cm    Follow-up in: 2 weeks  Patient tolerated procedure well without complications  Assessment:     1  Basal cell carcinoma of foot, right           Plan:   Wound care instructions given to patient        Prior to Admission medications    Medication Sig Start Date End Date Taking?  Authorizing Provider   cholecalciferol (VITAMIN D3) 1,000 units tablet Take by mouth   Yes Historical Provider, MD   cyanocobalamin (VITAMIN B-12) 100 mcg tablet Take by mouth daily   Yes Historical Provider, MD   Fish Oil-Krill Oil (KRILL OIL PLUS) CAPS Take by mouth   Yes Historical Provider, MD   losartan (COZAAR) 25 mg tablet Take by mouth   Yes Historical Provider, MD   metoprolol tartrate (LOPRESSOR) 50 mg tablet Take by mouth   Yes Historical Provider, MD   warfarin (COUMADIN) 5 mg tablet Take by mouth   Yes Historical Provider, MD   fluticasone (CUTIVATE) 0 05 % cream Apply topically daily To rash in groin until resolved  Patient not taking: Reported on 6/29/2020 12/10/19   Rhea Mane MD     Allergies   Allergen Reactions    Erythromycin        Rhea Mane MD  8/57/2681,4:72 PM    Portions of the record may have been created with voice recognition software   Occasional wrong word or "sound a like" substitutions may have occurred due to the inherent limitations of voice recognition software   Read the chart carefully and recognize, using context, where substitutions have occurred

## 2020-08-26 ENCOUNTER — TELEPHONE (OUTPATIENT)
Dept: DERMATOLOGY | Facility: CLINIC | Age: 75
End: 2020-08-26

## 2020-08-26 NOTE — TELEPHONE ENCOUNTER
----- Message from Glendy Maria MD sent at 8/25/2020  2:57 PM EDT -----  Call patient if not coming in shortly for suture removal to let him know that the margins were clear

## 2020-09-03 ENCOUNTER — OFFICE VISIT (OUTPATIENT)
Dept: DERMATOLOGY | Facility: CLINIC | Age: 75
End: 2020-09-03

## 2020-09-03 DIAGNOSIS — C44.712 BASAL CELL CARCINOMA OF FOOT, RIGHT: Primary | ICD-10-CM

## 2020-09-03 PROCEDURE — RECHECK: Performed by: DERMATOLOGY

## 2020-09-03 NOTE — PATIENT INSTRUCTIONS
Wound care instructions given to patient  STERI-STRIPS (BUTTERFLY) POST SURGERY        Steri-Strips have been placed over your incision site to give added support  Please continue to bathe the area as usual     Eventually the Steri-Strips will begin to peel off on the ends  Snip the raised ends off with scissors and let the rest fall off on their own  If you have any questions, please call our office   [phone]

## 2020-09-03 NOTE — PROGRESS NOTES
[facility] DERMATOLOGY  [address]  [place] [address]  [phone]     MRN: [id number] : 1945  Encounter: [id number]  Patient Information: Rica Ac    Subjective:     60-year-old male presents for suture removal from previously really excised basal cell carcinoma right dorsum left     Objective: There were no vitals taken for this visit  Physical Exam:    General Appearance:    Alert, cooperative, no distress   Skin:  Previous site of excision healing  Procedure:  Suture removal  Site of excision:  Right dorsum  Wound was cleansed with saline  Wound is intact  Sutures removed  Steri-Strips applied  Biopsy revealed further evidence of basal cell carcinoma     Assessment:     1  Basal cell carcinoma of foot, right           Plan:   Wound care instructions given to patient        Prior to Admission medications    Medication Sig Start Date End Date Taking?  Authorizing Provider   cholecalciferol (VITAMIN D3) 1,000 units tablet Take by mouth    Historical Provider, MD   cyanocobalamin (VITAMIN B-12) 100 mcg tablet Take by mouth daily    Historical Provider, MD   Fish Oil-Krill Oil (KRILL OIL PLUS) CAPS Take by mouth    Historical Provider, MD   fluticasone (CUTIVATE) 0 05 % cream Apply topically daily To rash in groin until resolved  Patient not taking: Reported on 2020 12/10/19   Stiven Treadwell MD   losartan (COZAAR) 25 mg tablet Take by mouth    Historical Provider, MD   metoprolol tartrate (LOPRESSOR) 50 mg tablet Take by mouth    Historical Provider, MD   warfarin (COUMADIN) 5 mg tablet Take by mouth    Historical Provider, MD     Allergies   Allergen Reactions    Erythromycin        Stiven Treadwell MD  9/3/2020,2:13 PM    Portions of the record may have been created with voice recognition software   Occasional wrong word or "sound a like" substitutions may have occurred due to the inherent limitations of voice recognition software   Read the chart carefully and recognize, using context, where substitutions have occurred

## 2020-12-22 ENCOUNTER — OFFICE VISIT (OUTPATIENT)
Dept: DERMATOLOGY | Facility: CLINIC | Age: 75
End: 2020-12-22
Payer: MEDICARE

## 2020-12-22 VITALS — TEMPERATURE: 97.3 F

## 2020-12-22 DIAGNOSIS — Z85.820 HISTORY OF MELANOMA: ICD-10-CM

## 2020-12-22 DIAGNOSIS — L57.0 ACTINIC KERATOSIS: Primary | ICD-10-CM

## 2020-12-22 DIAGNOSIS — L82.1 SEBORRHEIC KERATOSIS: ICD-10-CM

## 2020-12-22 DIAGNOSIS — Z13.89 SCREENING FOR SKIN CONDITION: ICD-10-CM

## 2020-12-22 DIAGNOSIS — Z85.828 HISTORY OF SKIN CANCER: ICD-10-CM

## 2020-12-22 PROCEDURE — 99213 OFFICE O/P EST LOW 20 MIN: CPT | Performed by: DERMATOLOGY

## 2020-12-22 PROCEDURE — 17003 DESTRUCT PREMALG LES 2-14: CPT | Performed by: DERMATOLOGY

## 2020-12-22 PROCEDURE — 17000 DESTRUCT PREMALG LESION: CPT | Performed by: DERMATOLOGY

## 2020-12-22 RX ORDER — LOSARTAN POTASSIUM 50 MG/1
TABLET ORAL
COMMUNITY
Start: 2020-11-28

## 2021-03-30 DIAGNOSIS — Z23 ENCOUNTER FOR IMMUNIZATION: ICD-10-CM

## 2021-06-24 ENCOUNTER — OFFICE VISIT (OUTPATIENT)
Dept: DERMATOLOGY | Facility: CLINIC | Age: 76
End: 2021-06-24
Payer: MEDICARE

## 2021-06-24 DIAGNOSIS — L57.0 ACTINIC KERATOSIS: Primary | ICD-10-CM

## 2021-06-24 DIAGNOSIS — Z85.828 HISTORY OF SKIN CANCER: ICD-10-CM

## 2021-06-24 DIAGNOSIS — L30.4 INTERTRIGO: ICD-10-CM

## 2021-06-24 DIAGNOSIS — B00.9 HERPES SIMPLEX INFECTION: ICD-10-CM

## 2021-06-24 DIAGNOSIS — L40.8 INVERSE PSORIASIS: ICD-10-CM

## 2021-06-24 DIAGNOSIS — Z85.820 HISTORY OF MELANOMA: ICD-10-CM

## 2021-06-24 DIAGNOSIS — Z13.89 SCREENING FOR SKIN CONDITION: ICD-10-CM

## 2021-06-24 DIAGNOSIS — L82.1 SEBORRHEIC KERATOSIS: ICD-10-CM

## 2021-06-24 PROCEDURE — 99214 OFFICE O/P EST MOD 30 MIN: CPT | Performed by: DERMATOLOGY

## 2021-06-24 PROCEDURE — 17000 DESTRUCT PREMALG LESION: CPT | Performed by: DERMATOLOGY

## 2021-06-24 PROCEDURE — 17003 DESTRUCT PREMALG LES 2-14: CPT | Performed by: DERMATOLOGY

## 2021-06-24 RX ORDER — FLUTICASONE PROPIONATE 0.05 %
CREAM (GRAM) TOPICAL DAILY
Qty: 30 G | Refills: 1 | Status: SHIPPED | OUTPATIENT
Start: 2021-06-24

## 2021-06-24 RX ORDER — VALACYCLOVIR HYDROCHLORIDE 1 G/1
2000 TABLET, FILM COATED ORAL 2 TIMES DAILY
Qty: 8 TABLET | Refills: 4 | Status: SHIPPED | OUTPATIENT
Start: 2021-06-24 | End: 2021-06-25

## 2021-06-24 NOTE — PATIENT INSTRUCTIONS
Actinic Keratosis:  Patient advised lesions are precancers  These should resolve with cryosurgery if not to let us know sun block recommended  Herpes simplex infection we discussed the concept of this process will have the patient try valacyclovir to take 2000 mg at prodrome and in take another 2000 mg 12 hours later to see if this will help shorten the course of these episodes  Inverse psoriasis patient with known intertrigo previously however now more consistent with psoriasis with involvement of the scalp postauricular ears axilla will go ahead treat with fluticasone cream as we has given to him previously  Seborrheic keratosis patient reassured these are normal growths we acquire with age no treatment needed  History of melanoma and nonmelanoma skin cancer no recurrence nothing else atypical sunblock recommended follow-up in 6 months  Screening for dermatologic disorders nothing else of concern noted on complete exam follow-up in 6 months  Treatment with Cryotherapy    The doctor has treated your skin with nitrogen, which is 320 degrees Fahrenheit below zero  He has given the treated area "frostbite "    Stinging should subside within a few hours  You can take Tylenol for pain, if needed  Over the next few days, it is normal if the area becomes reddened, a blood blister, or swollen with fluid  If the lesion treated was near the eye - you could get a swollen eye over the next few days  Do not panic! This is all temporary, and will resolve with time  There is no special treatment - just keep the area clean  Makeup and BandAids can be used, if preferred  When the area starts to dry up and peel off, using Vaseline can help healing  It usually takes up to a month for it to heal   Some lesions are recurrent and may require repeat treatments  If a lesion has not healed in one month, please don't hesitate to contact us        If you have any further questions that are not answered here, please call     [phone]    Thank you for allowing us to care for you

## 2021-06-24 NOTE — PROGRESS NOTES
[facility] DERMATOLOGY  [address] [place] [address]  Yas Mathias [address] [address]  [phone]  [phone]     MRN: [id number] : 1945  Encounter: [id number]  Patient Information: Rica Ac  Chief complaint: 6 month check up    History of present illness: 53-year-old male presents for overall checkup with previous history of both melanoma nonmelanoma skin cancer notes some scaling areas on his skin also concerned regarding rash that is been going on since March no previous history of psoriasis noted  In addition patient notes cold sores that seem to be recurrence both than left groin fold and also on the left patient wishes some treatment for this process  Past Medical History:   Diagnosis Date    Hypertension     Prostate cancer ([facility] [address] )     Stroke [facility])      Past Surgical History:   Procedure Laterality Date    SKIN SURGERY       Social History   Social History     Substance and Sexual Activity   Alcohol Use Yes    Comment: on occasion     Social History     Substance and Sexual Activity   Drug Use Never     Social History     Tobacco Use   Smoking Status Never Smoker   Smokeless Tobacco Never Used     History reviewed  No pertinent family history  Meds/Allergies   Allergies   Allergen Reactions    Erythromycin        Meds:  Prior to Admission medications    Medication Sig Start Date End Date Taking?  Authorizing Provider   cholecalciferol (VITAMIN D3) 1,000 units tablet Take by mouth   Yes Historical Provider, MD   cyanocobalamin (VITAMIN B-12) 100 mcg tablet Take by mouth daily   Yes Historical Provider, MD   Fish Oil-Krill Oil (KRILL OIL PLUS) CAPS Take by mouth   Yes Historical Provider, MD   losartan (COZAAR) 50 mg tablet TK 1 T PO  D 20  Yes Historical Provider, MD   metoprolol tartrate (LOPRESSOR) 50 mg tablet Take by mouth   Yes Historical Provider, MD   warfarin (COUMADIN) 5 mg tablet Take by mouth   Yes Historical Provider, MD   fluticasone (CUTIVATE) 0 05 % cream Apply topically daily To rash in groin until resolved  Patient not taking: Reported on 6/24/2021 12/10/19   William Cisse MD   losartan (COZAAR) 25 mg tablet Take by mouth  Patient not taking: Reported on 6/24/2021    Historical Provider, MD       Subjective:     Review of Systems:    General: negative for - chills, fatigue, fever,  weight gain or weight loss  Psychological: negative for - anxiety, behavioral disorder, concentration difficulties, decreased libido, depression, irritability, memory difficulties, mood swings, sleep disturbances or suicidal ideation  ENT: negative for - hearing difficulties , nasal congestion, nasal discharge, oral lesions, sinus pain, sneezing, sore throat  Allergy and Immunology: negative for - hives, insect bite sensitivity,  Hematological and Lymphatic: negative for - bleeding problems, blood clots,bruising, swollen lymph nodes  Endocrine: negative for - hair pattern changes, hot flashes, malaise/lethargy, mood swings, palpitations, polydipsia/polyuria, skin changes, temperature intolerance or unexpected weight change  Respiratory: negative for - cough, hemoptysis, orthopnea, shortness of breath, or wheezing  Cardiovascular: negative for - chest pain, dyspnea on exertion, edema,  Gastrointestinal: negative for - abdominal pain, nausea/vomiting  Genito-Urinary: negative for - dysuria, incontinence, irregular/heavy menses or urinary frequency/urgency  Musculoskeletal: negative for - gait disturbance, joint pain, joint stiffness, joint swelling, muscle pain, muscular weakness  Dermatological:  As in HPI  Neurological: negative for confusion, dizziness, headaches, impaired coordination/balance, memory loss, numbness/tingling, seizures, speech problems, tremors or weakness       Objective: There were no vitals taken for this visit      Physical Exam:    General Appearance:    Alert, cooperative, no distress   Head:    Normocephalic, without obvious abnormality, atraumatic Skin:   A full skin exam was performed including scalp, head scalp, eyes, ears, nose, lips, neck, chest, axilla, abdomen, back, buttocks, bilateral upper extremities, bilateral lower extremities, hands, feet, fingers, toes, fingernails, and toenails scaling area noted below normal keratotic papules greasy stuck on appearance previous sites skin cancer well healed without recurrence grew crusted area noted on the left groin fold scaling erythematous well-demarcated patches noted on the scalp postauricular ears axilla and inframammary area  Physical Exam  HENT:      Head:          Cryotherapy Procedure Note    Pre-operative Diagnosis: actinic keratosis    Plan:  1  Instructed to keep the area dry and clean thereafter  Apply petrolatum if area gets crusty  2  Recommended that the patient use acetaminophen  as needed for pain  Locations:  Face and left shoulder    Indications: Destruction of actinic keratoses x7    Patient informed of risks (permanent scarring, infection, light or dark discoloration, bleeding, infection, weakness, numbness and recurrence of the lesion) and benefits of the procedure and verbal informed consent obtained  The areas are treated with liquid nitrogen therapy, frozen until ice ball extended 2 mm beyond lesion, allowed to thaw, and treated again  The patient tolerated procedure well  The patient was instructed on post-op care, warned that there may be blister formation, redness and pain  Recommend OTC analgesia as needed for pain  Condition:  Stable    Complications:  none  Assessment:     1  Actinic keratosis     2  Seborrheic keratosis     3  Inverse psoriasis     4  Intertrigo  fluticasone (CUTIVATE) 0 05 % cream   5  History of melanoma     6  History of skin cancer     7  Screening for skin condition     8  Herpes simplex infection  valACYclovir (VALTREX) 1,000 mg tablet         Plan:   Actinic Keratosis:  Patient advised lesions are precancers   These should resolve with cryosurgery if not to let us know sun block recommended  Herpes simplex infection we discussed the concept of this process will have the patient try valacyclovir to take 2000 mg at prodrome and in take another 2000 mg 12 hours later to see if this will help shorten the course of these episodes  Inverse psoriasis patient with known intertrigo previously however now more consistent with psoriasis with involvement of the scalp postauricular ears axilla will go ahead treat with fluticasone cream as we has given to him previously  Seborrheic keratosis patient reassured these are normal growths we acquire with age no treatment needed  History of melanoma and nonmelanoma skin cancer no recurrence nothing else atypical sunblock recommended follow-up in 6 months  Screening for dermatologic disorders nothing else of concern noted on complete exam follow-up in 6 months    Geneva Pretty MD  6/24/2021,1:40 PM    Portions of the record may have been created with voice recognition software   Occasional wrong word or "sound a like" substitutions may have occurred due to the inherent limitations of voice recognition software   Read the chart carefully and recognize, using context, where substitutions have occurred

## 2023-11-24 NOTE — PATIENT INSTRUCTIONS
Actinic Keratosis:  Patient advised lesions are precancers  These should resolve with cryosurgery if not to let us know sun block recommended  Intertrigo question inverse psoriasis will go ahead treat with a topical steroid follow-up if no improvement  Seborrheic keratosis patient reassured these are normal growths we acquire with age no treatment needed  History of skin cancer in no recurrence nothing else atypical sunblock recommended follow-up in 4 months  Screening for dermatologic disorders nothing else of concern noted on complete exam follow-up in 4 months  Treatment with Cryotherapy    The doctor has treated your skin with nitrogen, which is 320 degrees Fahrenheit below zero  He has given the treated area "frostbite "    Stinging should subside within a few hours  You can take Tylenol for pain, if needed  Over the next few days, it is normal if the area becomes reddened, a blood blister, or swollen with fluid  If the lesion treated was near the eye - you could get a swollen eye over the next few days  Do not panic! This is all temporary, and will resolve with time  There is no special treatment - just keep the area clean  Makeup and BandAids can be used, if preferred  When the area starts to dry up and peel off, using Vaseline can help healing  It usually takes up to a month for it to heal   Some lesions are recurrent and may require repeat treatments  If a lesion has not healed in one month, please don't hesitate to contact us  If you have any further questions that are not answered here, please call us  [phone]    Thank you for allowing us to care for you 
within normal limits